# Patient Record
Sex: FEMALE | Race: WHITE | Employment: UNEMPLOYED | URBAN - METROPOLITAN AREA
[De-identification: names, ages, dates, MRNs, and addresses within clinical notes are randomized per-mention and may not be internally consistent; named-entity substitution may affect disease eponyms.]

---

## 2022-12-12 RX ORDER — ASPIRIN 81 MG/1
81 TABLET ORAL DAILY
COMMUNITY

## 2022-12-12 NOTE — PROGRESS NOTES
Name_______________________________________Printed:____________________  Date and time of surgery____12/14/2022_______0730_____________Arrival Time:_______0600_________   1. The instructions given regarding when and if a patient needs to stop oral intake prior to surgery varies. Follow the specific instructions you were given                  __x_Nothing to eat or to drink after Midnight the night before.                   ____Carbo loading or ERAS instructions will be given to select patients-if you have been given those instructions -please do the following                           The evening before your surgery after dinner before midnight drink 40 ounces of gatorade. If you are diabetic use sugar free. The morning of surgery drink 40 ounces of water. This needs to be finished 3 hours prior to your surgery start time. 2. Take the following pills with a small sip of water on the morning of surgery___________________________________________________                  Do not take blood pressure medications ending in pril or sartan the kin prior to surgery or the morning of surgery. Dr Sandee Salazar patient are not to take any medications the AM of surgery. 3. Aspirin, Ibuprofen, Advil, Naproxen, Vitamin E and other Anti-inflammatory products and supplements should be stopped for 5 -7days before surgery or as directed by your physician. 4. Check with your Doctor regarding stopping Plavix, Coumadin,Eliquis, Lovenox,Effient,Pradaxa,Xarelto, Fragmin or other blood thinners and follow their instructions. 5. Do not smoke, and do not drink any alcoholic beverages 24 hours prior to surgery. This includes NA Beer. Refrain from the usage of any recreational drugs. 6. You may brush your teeth and gargle the morning of surgery. DO NOT SWALLOW WATER   7. You MUST make arrangements for a responsible adult to stay on site while you are here and take you home after your surgery.  You will not be allowed to leave alone or drive yourself home. It is strongly suggested someone stay with you the first 24 hrs. Your surgery will be cancelled if you do not have a ride home. 8. A parent/legal guardian must accompany a child scheduled for surgery and plan to stay at the hospital until the child is discharged. Please do not bring other children with you. 9. Please wear simple, loose fitting clothing to the hospital.  Dianelys Maloney not bring valuables (money, credit cards, checkbooks, etc.) Do not wear any makeup (including no eye makeup) or nail polish on your fingers or toes. 10. DO NOT wear any jewelry or piercings on day of surgery. All body piercing jewelry must be removed. 11. If you have ___dentures, they will be removed before going to the OR; we will provide you a container. If you wear ___contact lenses or ___glasses, they will be removed; please bring a case for them. 12. Please see your family doctor/pediatrician for a history & physical and/or concerning medications. Bring any test results/reports from your physician's office. PCP__________________Phone___________H&P Appt. Date________             13 If you  have a Living Will and Durable Power of  for Healthcare, please bring in a copy. 15. Notify your Surgeon if you develop any illness between now and surgery  time, cough, cold, fever, sore throat, nausea, vomiting, etc.  Please notify your surgeon if you experience dizziness, shortness of breath or blurred vision between now & the time of your surgery             15. DO NOT shave your operative site 96 hours prior to surgery. For face & neck surgery, men may use an electric razor 48 hours prior to surgery. 16. Shower the night before or morning of surgery using an antibacterial soap or as you have been instructed. 17. To provide excellent care visitors will be limited to one in the room at any given time.              18.  Please bring picture ID and insurance card. 19.  Visit our web site for additional information:  [email or web address]              20.During flu season no children under the age of 15 are permitted in the hospital for the safety of all patients. 21. If you take a long acting insulin in the evening only  take half of your usual  dose the night  before your procedure              22. If you use a c-pap please bring DOS if staying overnight,             23.For your convenience [facility] has a pharmacy on site to fill your prescriptions. 24. If you use oxygen and have a portable tank please bring it  with you the DOS             25. Bring a complete list of all your medications with name and dose include any supplements. 26. Other__________________________________________   *Please call pre admission testing if you any further questions   Ciara [facility]   [address] [place] [address]. [place]  [phone]   [address] [place] [address]       VISITOR POLICY(subject to change)    Current policy is 2 visitors per patient. No children. Mask is  at the discretion of the facility. Visiting hours are 8a-8p. Overnight visitors will be at the discretion of the nurse. All policies subject to change. All above information reviewed with patient in person or by phone. Patient verbalizes understanding. All questions and concerns addressed.                                                                                                  Patient/Rep__patient__________________                                                                                                                                    PRE OP INSTRUCTIONS

## 2022-12-14 ENCOUNTER — ANESTHESIA EVENT (OUTPATIENT)
Dept: OPERATING ROOM | Age: 47
End: 2022-12-14
Payer: COMMERCIAL

## 2022-12-14 ENCOUNTER — HOSPITAL ENCOUNTER (OUTPATIENT)
Age: 47
Setting detail: OUTPATIENT SURGERY
Discharge: HOME | End: 2022-12-14
Attending: PODIATRIST | Admitting: PODIATRIST
Payer: COMMERCIAL

## 2022-12-14 ENCOUNTER — ANESTHESIA (OUTPATIENT)
Dept: OPERATING ROOM | Age: 47
End: 2022-12-14
Payer: COMMERCIAL

## 2022-12-14 VITALS
HEART RATE: 72 BPM | TEMPERATURE: 97.2 F | HEIGHT: 65 IN | BODY MASS INDEX: 29.82 KG/M2 | OXYGEN SATURATION: 99 % | DIASTOLIC BLOOD PRESSURE: 85 MMHG | RESPIRATION RATE: 16 BRPM | SYSTOLIC BLOOD PRESSURE: 129 MMHG | WEIGHT: 179 LBS

## 2022-12-14 PROCEDURE — 2580000003 HC RX 258: Performed by: NURSE ANESTHETIST, CERTIFIED REGISTERED

## 2022-12-14 PROCEDURE — 7100000011 HC PHASE II RECOVERY - ADDTL 15 MIN: Performed by: PODIATRIST

## 2022-12-14 PROCEDURE — 64447 NJX AA&/STRD FEMORAL NRV IMG: CPT | Performed by: ANESTHESIOLOGY

## 2022-12-14 PROCEDURE — 3600000014 HC SURGERY LEVEL 4 ADDTL 15MIN: Performed by: PODIATRIST

## 2022-12-14 PROCEDURE — 6360000002 HC RX W HCPCS: Performed by: ANESTHESIOLOGY

## 2022-12-14 PROCEDURE — 2709999900 HC NON-CHARGEABLE SUPPLY: Performed by: PODIATRIST

## 2022-12-14 PROCEDURE — 2500000003 HC RX 250 WO HCPCS: Performed by: NURSE ANESTHETIST, CERTIFIED REGISTERED

## 2022-12-14 PROCEDURE — 3600000004 HC SURGERY LEVEL 4 BASE: Performed by: PODIATRIST

## 2022-12-14 PROCEDURE — 7100000010 HC PHASE II RECOVERY - FIRST 15 MIN: Performed by: PODIATRIST

## 2022-12-14 PROCEDURE — 3700000001 HC ADD 15 MINUTES (ANESTHESIA): Performed by: PODIATRIST

## 2022-12-14 PROCEDURE — 6360000002 HC RX W HCPCS: Performed by: NURSE ANESTHETIST, CERTIFIED REGISTERED

## 2022-12-14 PROCEDURE — 2720000010 HC SURG SUPPLY STERILE: Performed by: PODIATRIST

## 2022-12-14 PROCEDURE — 6360000002 HC RX W HCPCS: Performed by: PODIATRIST

## 2022-12-14 PROCEDURE — 2580000003 HC RX 258: Performed by: PODIATRIST

## 2022-12-14 PROCEDURE — 7100000000 HC PACU RECOVERY - FIRST 15 MIN: Performed by: PODIATRIST

## 2022-12-14 PROCEDURE — 3700000000 HC ANESTHESIA ATTENDED CARE: Performed by: PODIATRIST

## 2022-12-14 PROCEDURE — 7100000001 HC PACU RECOVERY - ADDTL 15 MIN: Performed by: PODIATRIST

## 2022-12-14 PROCEDURE — C1713 ANCHOR/SCREW BN/BN,TIS/BN: HCPCS | Performed by: PODIATRIST

## 2022-12-14 PROCEDURE — C1769 GUIDE WIRE: HCPCS | Performed by: PODIATRIST

## 2022-12-14 DEVICE — CP LAG SCREW (T10)
Type: IMPLANTABLE DEVICE | Site: FOOT | Status: FUNCTIONAL
Brand: ANCHORAGE

## 2022-12-14 DEVICE — LOCKING SCREW
Type: IMPLANTABLE DEVICE | Site: FOOT | Status: FUNCTIONAL
Brand: VARIAX

## 2022-12-14 DEVICE — POLYAXIAL LOCKING PLATE -  LAPIDUS CROSS-PLATE, LEFT (T10)
Type: IMPLANTABLE DEVICE | Site: FOOT | Status: FUNCTIONAL
Brand: ANCHORAGE

## 2022-12-14 DEVICE — INJECTABLE KIT
Type: IMPLANTABLE DEVICE | Site: FOOT | Status: FUNCTIONAL
Brand: AUGMENT® INJECTABLE

## 2022-12-14 RX ORDER — SUCCINYLCHOLINE/SOD CL,ISO/PF 200MG/10ML
SYRINGE (ML) INTRAVENOUS PRN
Status: DISCONTINUED | OUTPATIENT
Start: 2022-12-14 | End: 2022-12-14 | Stop reason: SDUPTHER

## 2022-12-14 RX ORDER — ROPIVACAINE HYDROCHLORIDE 5 MG/ML
INJECTION, SOLUTION EPIDURAL; INFILTRATION; PERINEURAL PRN
Status: DISCONTINUED | OUTPATIENT
Start: 2022-12-14 | End: 2022-12-14 | Stop reason: SDUPTHER

## 2022-12-14 RX ORDER — SODIUM CHLORIDE 9 MG/ML
INJECTION, SOLUTION INTRAVENOUS PRN
Status: DISCONTINUED | OUTPATIENT
Start: 2022-12-14 | End: 2022-12-14 | Stop reason: HOSPADM

## 2022-12-14 RX ORDER — SODIUM CHLORIDE 0.9 % (FLUSH) 0.9 %
5-40 SYRINGE (ML) INJECTION EVERY 12 HOURS SCHEDULED
Status: DISCONTINUED | OUTPATIENT
Start: 2022-12-14 | End: 2022-12-14 | Stop reason: HOSPADM

## 2022-12-14 RX ORDER — SODIUM CHLORIDE, SODIUM LACTATE, POTASSIUM CHLORIDE, CALCIUM CHLORIDE 600; 310; 30; 20 MG/100ML; MG/100ML; MG/100ML; MG/100ML
INJECTION, SOLUTION INTRAVENOUS CONTINUOUS
Status: DISCONTINUED | OUTPATIENT
Start: 2022-12-14 | End: 2022-12-14 | Stop reason: HOSPADM

## 2022-12-14 RX ORDER — ONDANSETRON 2 MG/ML
4 INJECTION INTRAMUSCULAR; INTRAVENOUS
Status: DISCONTINUED | OUTPATIENT
Start: 2022-12-14 | End: 2022-12-14 | Stop reason: HOSPADM

## 2022-12-14 RX ORDER — SODIUM CHLORIDE 0.9 % (FLUSH) 0.9 %
5-40 SYRINGE (ML) INJECTION PRN
Status: DISCONTINUED | OUTPATIENT
Start: 2022-12-14 | End: 2022-12-14 | Stop reason: HOSPADM

## 2022-12-14 RX ORDER — ELETRIPTAN HYDROBROMIDE 40 MG/1
40 TABLET, FILM COATED ORAL
COMMUNITY

## 2022-12-14 RX ORDER — DEXAMETHASONE SODIUM PHOSPHATE 4 MG/ML
INJECTION, SOLUTION INTRA-ARTICULAR; INTRALESIONAL; INTRAMUSCULAR; INTRAVENOUS; SOFT TISSUE PRN
Status: DISCONTINUED | OUTPATIENT
Start: 2022-12-14 | End: 2022-12-14 | Stop reason: SDUPTHER

## 2022-12-14 RX ORDER — SODIUM CHLORIDE, SODIUM LACTATE, POTASSIUM CHLORIDE, CALCIUM CHLORIDE 600; 310; 30; 20 MG/100ML; MG/100ML; MG/100ML; MG/100ML
INJECTION, SOLUTION INTRAVENOUS CONTINUOUS PRN
Status: DISCONTINUED | OUTPATIENT
Start: 2022-12-14 | End: 2022-12-14 | Stop reason: SDUPTHER

## 2022-12-14 RX ORDER — PROPOFOL 10 MG/ML
INJECTION, EMULSION INTRAVENOUS PRN
Status: DISCONTINUED | OUTPATIENT
Start: 2022-12-14 | End: 2022-12-14 | Stop reason: SDUPTHER

## 2022-12-14 RX ORDER — FENTANYL CITRATE 50 UG/ML
INJECTION, SOLUTION INTRAMUSCULAR; INTRAVENOUS PRN
Status: DISCONTINUED | OUTPATIENT
Start: 2022-12-14 | End: 2022-12-14 | Stop reason: SDUPTHER

## 2022-12-14 RX ORDER — BUPIVACAINE HYDROCHLORIDE AND EPINEPHRINE 5; 5 MG/ML; UG/ML
INJECTION, SOLUTION PERINEURAL
Status: COMPLETED | OUTPATIENT
Start: 2022-12-14 | End: 2022-12-14

## 2022-12-14 RX ORDER — MIDAZOLAM HYDROCHLORIDE 2 MG/2ML
2 INJECTION, SOLUTION INTRAMUSCULAR; INTRAVENOUS ONCE
Status: COMPLETED | OUTPATIENT
Start: 2022-12-14 | End: 2022-12-14

## 2022-12-14 RX ORDER — ROCURONIUM BROMIDE 10 MG/ML
INJECTION, SOLUTION INTRAVENOUS PRN
Status: DISCONTINUED | OUTPATIENT
Start: 2022-12-14 | End: 2022-12-14 | Stop reason: SDUPTHER

## 2022-12-14 RX ORDER — LIDOCAINE HYDROCHLORIDE 20 MG/ML
INJECTION, SOLUTION INFILTRATION; PERINEURAL PRN
Status: DISCONTINUED | OUTPATIENT
Start: 2022-12-14 | End: 2022-12-14 | Stop reason: SDUPTHER

## 2022-12-14 RX ORDER — IBUPROFEN 800 MG/1
800 TABLET ORAL EVERY 6 HOURS PRN
COMMUNITY

## 2022-12-14 RX ORDER — ONDANSETRON 2 MG/ML
INJECTION INTRAMUSCULAR; INTRAVENOUS PRN
Status: DISCONTINUED | OUTPATIENT
Start: 2022-12-14 | End: 2022-12-14 | Stop reason: SDUPTHER

## 2022-12-14 RX ORDER — HYDROMORPHONE HCL 110MG/55ML
0.25 PATIENT CONTROLLED ANALGESIA SYRINGE INTRAVENOUS EVERY 5 MIN PRN
Status: DISCONTINUED | OUTPATIENT
Start: 2022-12-14 | End: 2022-12-14 | Stop reason: HOSPADM

## 2022-12-14 RX ORDER — LIDOCAINE HYDROCHLORIDE 10 MG/ML
0.5 INJECTION, SOLUTION EPIDURAL; INFILTRATION; INTRACAUDAL; PERINEURAL ONCE
Status: DISCONTINUED | OUTPATIENT
Start: 2022-12-14 | End: 2022-12-14 | Stop reason: HOSPADM

## 2022-12-14 RX ORDER — HYDROMORPHONE HCL 110MG/55ML
0.5 PATIENT CONTROLLED ANALGESIA SYRINGE INTRAVENOUS EVERY 5 MIN PRN
Status: DISCONTINUED | OUTPATIENT
Start: 2022-12-14 | End: 2022-12-14 | Stop reason: HOSPADM

## 2022-12-14 RX ADMIN — SODIUM CHLORIDE, POTASSIUM CHLORIDE, SODIUM LACTATE AND CALCIUM CHLORIDE: 600; 310; 30; 20 INJECTION, SOLUTION INTRAVENOUS at 08:50

## 2022-12-14 RX ADMIN — DEXAMETHASONE SODIUM PHOSPHATE 8 MG: 4 INJECTION, SOLUTION INTRAMUSCULAR; INTRAVENOUS at 07:56

## 2022-12-14 RX ADMIN — FENTANYL CITRATE 50 MCG: 50 INJECTION, SOLUTION INTRAMUSCULAR; INTRAVENOUS at 07:33

## 2022-12-14 RX ADMIN — HYDROMORPHONE HYDROCHLORIDE 0.25 MG: 2 INJECTION, SOLUTION INTRAMUSCULAR; INTRAVENOUS; SUBCUTANEOUS at 09:54

## 2022-12-14 RX ADMIN — CEFAZOLIN 2000 MG: 2 INJECTION, POWDER, FOR SOLUTION INTRAMUSCULAR; INTRAVENOUS at 07:31

## 2022-12-14 RX ADMIN — Medication 140 MG: at 07:34

## 2022-12-14 RX ADMIN — SODIUM CHLORIDE, POTASSIUM CHLORIDE, SODIUM LACTATE AND CALCIUM CHLORIDE: 600; 310; 30; 20 INJECTION, SOLUTION INTRAVENOUS at 06:43

## 2022-12-14 RX ADMIN — SODIUM CHLORIDE, POTASSIUM CHLORIDE, SODIUM LACTATE AND CALCIUM CHLORIDE: 600; 310; 30; 20 INJECTION, SOLUTION INTRAVENOUS at 07:30

## 2022-12-14 RX ADMIN — MIDAZOLAM 2 MG: 1 INJECTION INTRAMUSCULAR; INTRAVENOUS at 07:20

## 2022-12-14 RX ADMIN — PROPOFOL 200 MG: 10 INJECTION, EMULSION INTRAVENOUS at 07:33

## 2022-12-14 RX ADMIN — ROPIVACAINE HYDROCHLORIDE 30 ML: 5 INJECTION, SOLUTION EPIDURAL; INFILTRATION; PERINEURAL at 07:22

## 2022-12-14 RX ADMIN — ONDANSETRON 4 MG: 2 INJECTION INTRAMUSCULAR; INTRAVENOUS at 07:56

## 2022-12-14 RX ADMIN — SUGAMMADEX 200 MG: 100 INJECTION, SOLUTION INTRAVENOUS at 09:21

## 2022-12-14 RX ADMIN — LIDOCAINE HYDROCHLORIDE 60 MG: 20 INJECTION, SOLUTION INFILTRATION; PERINEURAL at 07:33

## 2022-12-14 RX ADMIN — ROCURONIUM BROMIDE 30 MG: 10 INJECTION, SOLUTION INTRAVENOUS at 07:54

## 2022-12-14 RX ADMIN — MIDAZOLAM 2 MG: 1 INJECTION INTRAMUSCULAR; INTRAVENOUS at 07:15

## 2022-12-14 RX ADMIN — ROPIVACAINE HYDROCHLORIDE 20 ML: 5 INJECTION, SOLUTION EPIDURAL; INFILTRATION; PERINEURAL at 07:24

## 2022-12-14 ASSESSMENT — PAIN - FUNCTIONAL ASSESSMENT: PAIN_FUNCTIONAL_ASSESSMENT: 0-10

## 2022-12-14 ASSESSMENT — PAIN DESCRIPTION - DESCRIPTORS: DESCRIPTORS: ACHING

## 2022-12-14 ASSESSMENT — PAIN DESCRIPTION - LOCATION: LOCATION: HEAD

## 2022-12-14 ASSESSMENT — PAIN SCALES - GENERAL: PAINLEVEL_OUTOF10: 6

## 2022-12-14 NOTE — PROGRESS NOTES
Received from OR - Unresponsive to stimuli ,simple mask @ 6 liters 100%,left foot dressing dry and intact,elevated,circulation good,ice pack placed,,hitesh/SCD RLE,vss.

## 2022-12-14 NOTE — ANESTHESIA PRE PROCEDURE
Department of Anesthesiology  Preprocedure Note       Name:  Anupama Moore   Age:  56 y.o.  :  1975                                          MRN:  [id number]         Date:  2022      Surgeon: Marcus Costello):  Corey Crum DPM    Procedure: Procedure(s):  LAPIDUS FUSION-LEFT FOOT; APPLICATION BELOW KNEE SPLINT-LEFT LOWER LIMB-POPLITEAL BLOCK-SAPHENOUS-JULIAN    Medications prior to admission:   Prior to Admission medications    Medication Sig Start Date End Date Taking? Authorizing Provider   Cetirizine HCl (ZYRTEC ALLERGY PO) Take by mouth daily   Yes Historical Provider, MD   ALBUTEROL SULFATE IN Inhale 2 puffs into the lungs daily 90 mcg dose   Yes Historical Provider, MD   eletriptan (RELPAX) 40 MG tablet Take 40 mg by mouth once as needed (for migraines) may repeat in 2 hours if necessary   Yes Historical Provider, MD   ibuprofen (ADVIL;MOTRIN) 800 MG tablet Take 800 mg by mouth every 6 hours as needed for Pain   Yes Historical Provider, MD   aspirin 81 MG EC tablet Take 81 mg by mouth daily   Yes Historical Provider, MD   Ferrous Sulfate (IRON PO) Take by mouth   Yes Historical Provider, MD       Current medications:    Current Facility-Administered Medications   Medication Dose Route Frequency Provider Last Rate Last Admin    lactated ringers infusion   IntraVENous Continuous Corey Crum DPM 50 mL/hr at 22 0643 New Bag at 22 0643    ceFAZolin (ANCEF) 2,000 mg in dextrose 5 % 50 mL IVPB (mini-bag)  2,000 mg IntraVENous On Call to [facility]        lidocaine PF 1 % injection 0.5 mL  0.5 mL IntraDERmal Once Corey Crum DPM           Allergies:     Allergies   Allergen Reactions    Advair Hfa [Fluticasone-Salmeterol] Itching    Bactrim [Sulfamethoxazole-Trimethoprim] Hives    Benadryl [Diphenhydramine] Hives    Dramamine [Dimenhydrinate] Hives    Levaquin [Levofloxacin] Hives    Shellfish-Derived Products Hives       Problem List:  There is no problem list on file for this patient. Past Medical History:        Diagnosis Date    Asthma     Blood clotting disorder (HCC)     CAD (coronary artery disease)     Heart palpitations     Low iron     Migraine     Prolonged emergence from general anesthesia        Past Surgical History:        Procedure Laterality Date    ABDOMEN SURGERY      APPENDECTOMY      BREAST SURGERY      HYSTERECTOMY (CERVIX STATUS UNKNOWN)      MYOMECTOMY      OVARY REMOVAL      WISDOM TOOTH EXTRACTION         Social History:    Social History     Tobacco Use    Smoking status: Never     Passive exposure: Never    Smokeless tobacco: Never   Substance Use Topics    Alcohol use: Never                                Counseling given: Not Answered      Vital Signs (Current):   Vitals:    12/12/22 1547 12/14/22 0608   BP:  115/83   Pulse:  74   Resp:  15   Temp:  98.6 °F (37 °C)   TempSrc:  Temporal   SpO2:  100%   Weight: 165 lb (74.8 kg) 179 lb (81.2 kg)   Height: 5' 5\" (1.651 m) 5' 5\" (1.651 m)                                              BP Readings from Last 3 Encounters:   12/14/22 115/83       NPO Status: Time of last liquid consumption: 0000                        Time of last solid consumption: 0000                        Date of last liquid consumption: 12/14/22                        Date of last solid food consumption: 12/14/22    BMI:   Wt Readings from Last 3 Encounters:   12/14/22 179 lb (81.2 kg)     Body mass index is 29.79 kg/m². CBC: No results found for: WBC, RBC, HGB, HCT, MCV, RDW, PLT    CMP: No results found for: NA, K, CL, CO2, BUN, CREATININE, GFRAA, AGRATIO, LABGLOM, GLUCOSE, GLU, PROT, CALCIUM, BILITOT, ALKPHOS, AST, ALT    POC Tests: No results for input(s): POCGLU, POCNA, POCK, POCCL, POCBUN, POCHEMO, POCHCT in the last 72 hours.     Coags: No results found for: PROTIME, INR, APTT    HCG (If Applicable): No results found for: PREGTESTUR, PREGSERUM, HCG, HCGQUANT     ABGs: No results found for: PHART, PO2ART, XTY2UIC, VHY0WEM, BEART, U8HEATUM     Type & Screen (If Applicable):  No results found for: LABABO, LABRH    Drug/Infectious Status (If Applicable):  No results found for: HIV, HEPCAB    COVID-19 Screening (If Applicable): No results found for: COVID19        Anesthesia Evaluation  Patient summary reviewed and Nursing notes reviewed history of anesthetic complications (delayed emergence ):   Airway: Mallampati: I  TM distance: >3 FB   Neck ROM: full  Mouth opening: > = 3 FB   Dental: normal exam         Pulmonary:normal exam  breath sounds clear to auscultation  (+) asthma:                            Cardiovascular:  Exercise tolerance: good (>4 METS),       (-) CAD      Rhythm: regular  Rate: normal                    Neuro/Psych:   (+) headaches:,             GI/Hepatic/Renal: Neg GI/Hepatic/Renal ROS            Endo/Other: Negative Endo/Other ROS                    Abdominal:             Vascular: Other Findings:           Anesthesia Plan      MAC, general and regional     ASA 2     (Pop block left )  Induction: intravenous. MIPS: Postoperative opioids intended and Prophylactic antiemetics administered. Anesthetic plan and risks discussed with patient. Plan discussed with CRNA.     Attending anesthesiologist reviewed and agrees with Preprocedure content                Cecile Leon MD   12/14/2022

## 2022-12-14 NOTE — PROGRESS NOTES
Discharge instructions reviewed with patient/responsible adult. All home medications have been reviewed, questions answered and patient verbalized understanding. Discharge instructions signed and copies given. Patient discharged  per w/cwmayda belongings.

## 2022-12-14 NOTE — PROGRESS NOTES
Time out done, 02 on @ 2 l/min per n/c, then versed IV given , then Dr Cb Costello completed left popliteal & adductor canal nerve block, patient tolerated procedure well.

## 2022-12-14 NOTE — OP NOTE
Operative Note      Patient: Nelly Lomeli  YOB: 1975  MRN: [id number]    Date of Procedure: 12/14/2022    Pre-Op Diagnosis: Hallux valgus of left foot [M20.12]    Post-Op Diagnosis: Same       Procedure(s):  03689 Lapidus Fusion- left foot;  70943 Application Below Knee Splint- left lower limb     Surgeon(s):  Michelle Gallegos DPM     Assistant:  Jad Eden DPM, PGY-3  Gonzalo Louise, MS-4     Anesthesia: General with preoperative popliteal/saphenous nerve block     Hemostasis: Pneumatic calf tourniquet at 250 mmHg for 66 minutes     Injectables: 3 cc of 0.5% marcaine with epinephrine preoperatively     Materials: 3-0 vicryl, 4-0 vicryl, 5-0 vicryl     Implants:   Zhane [place] 2                Lapidus left CP plate x1                3.5 x 14mm locking screw x2                3.5 x 16mm locking screw x1                3.5 x 18mm locking screw x1                4.1 x 36mm lag screw x1  Zhane DartFire Edge Cannulated 4.0x36mm screw x1  Anton Augment Injectable 1.5cc     Estimated Blood Loss (mL): Minimal    Complications: None    Specimens:   * No specimens in log *    Implants:  Implant Name Type Inv.  Item Serial No.  Lot No. LRB No. Used Action   GRAFT BONE TIB INJ 1.5CC ALLOGRFT AUGMENT - [id number]  GRAFT BONE TIB INJ 1.5CC ALLOGRFT AUGMENT  [facility] [id number] Left 1 Implanted   PLATE POLYAXIAL LK LAPIDUS CROSS LT T10 - [id number]  PLATE POLYAXIAL LK LAPIDUS CROSS LT T10  [employer] [facility]  Left 1 Implanted   SCREW LK 16MM T10 FULL - [id number]  SCREW LK 16MM T10 FULL  [facility] [facility]  Left 1 Implanted   SCREW LK T10 FLTHRD 3.5X18MM - [id number]  SCREW LK T10 FLTHRD 3.5X18MM  [employer] [facility]  Left 1 Implanted   SCREW LK 14MM T10 FULL - [id number]  SCREW LK 14MM T10 FULL  [employer] [facility]  Left 2 Implanted   SCREW CP LAG 4.1X36MM T10 - [id number]  SCREW CP LAG 4.1X36MM T10  ZHANE SHIRA-WD  Left 1 Implanted DARTFIRE EDGE CANNULATED SCREW    [employer] DIV_CR [id number] Left 1 Implanted         Drains: * No LDAs found *    Findings: Good correction of above noted deformity upon completion of procedure. INDICATIONS FOR PROCEDURE: This patient has signs and symptoms clinically  consistent with the above mentioned preoperative diagnosis. Having failed conservative treatment, it was determined that the patient would benefit from surgical intervention. All potential risks, benefits, and complications were discussed with the patient prior to the scheduling of surgery. All the patient's questions were answered and no guarantees were given. The patient wished to proceed with surgery, and informed written consent was obtained. DETAILS OF PROCEDURE: The patient received a popliteal/saphenous nerve block performed by the anesthesiologist in the preoperative holding area. The patient was then brought from the pre-operative area and placed on the operating table in the supine position. A pneumatic calf tourniquet was placed around the patient's well-padded left lower extremity. Following IV sedation, 3cc of 0.5% marcaine with epinephrine was injected along the preoperatively marked incision line to aid in hemostasis. The left  lower extremity was then scrubbed, prepped, and draped in the usual sterile fashion. A time-out was performed. The patient, procedure, and operative site were confirmed. An Esmarch bandage was then utilized to exsanguinate the patient's left lower extremity. The tourniquet was then inflated to 250 mmHg and the following procedure was performed. PROCEDURE #1: 58357- Lapidus Fusion- left foot:  Attention was then directed towards the dorsal medial aspect of the 1st metatarsal cuneiform joint of the left foot.  Using a #15 blade, an approximately 8 cm skin incision was made over the dorsal medial aspect of the 1st metatarsal cuneiform joint and extended to just distal to the 1st metatarsal phalangeal joint. The incision was deepened through the subcutaneous tissues to the level of the deep fascia and capsule using a combination of sharp and blunt dissection. All bleeders were ligated using an electrocautery system. Attention was then directed to the first interspace via the original skin incision where the tendon of the extensor hallucis longus was observed and retracted and protected. Through a combination of sharp and blunt dissection the soft tissue contractures of the deep transverse intermetatarsal ligament, adductor hallucis tendon, and fibular suspensory ligament were released. Next, the hallux was distracted and pulled medially to further release the residual lateral contracture. At this time, great improvement of the lateral deviation of the hallux on the metatarsal head was noted. Next, using a #15 blade, the capsule overlying the first metatarsal cuneiform joint was incised and reflected from the underlying bone. Great care was taken to identify and protect the Tibialis Anterior tendon in the process. Using a small osteotome, the 1st metatarsal cuneiform joint was pried open to allow full access for preparation of the joint. After mobilizing the joint and freeing it from it's soft tissue attachments, attention was directed towards joint preparation. Using a sagittal saw with a #138 blade, the cartilage on the base of the 1st metatarsal was resected and passed from the operating field. This bone cut was made parallel to the existing cartilage on the metatarsal base. Next, the sagittal saw was used to resect the cartilage on the distal aspect of the medial cuneiform. This resection was angled to resect slightly more laterally to aid in intermetatarsal angle reduction. After satisfactory planning with the sagittal saw had been obtained, the surgical site was irrigated with copious amounts of normal saline.  Next, the joint surfaces and subchondral plates of the medial cuneiform and base of the first metatarsal were fenestrated using a 2.0mm drill bit. This was done until good bleeding bone was noted. Next, [employer] Augment Injectable was added to the arthrodesis site according to 's recommendations to optimize postoperative bony healing. Next, while grasping the hallux, the 1st metatarsal was derotated out of its varus attitude and the intermetatarsal angle was reduced and the joint surfaces firmly apposed. A 0.062 k-wire was driven from the base of the first metatarsal to the medial cuneiform as a means of temporary fixation. The intermetatarsal angle, fusion site, and tibial sesamoid position were checked using live intraoperative fluoroscopy and noted to be excellent. Length of the 1st metatarsal within the metatarsal parabola was noted to be maintained as well and the 1st metatarsal was noted to be parallel to the 2nd metatarsal on a lateral projection with no elevatus or plantarflexion noted. At this time, a template from the [employer]/[place] 2 system was placed over the fusion site and a K-wire was driven through the area of the pocket screw hole. Position of the plate was assess via c-arm and noted to be adequate. Next, using the 's recommendations, and the K-wire as a guide, the pocket for the compression screw was created using a cannulated reamer. The K-wire was removed and the [employer]/[place] 2 Lapidus plate was placed overtop the fusion site and held in place by two olive wires. Position of the plate was assessed and noted to be excellent. Next, using the 's recommendations and modified AO technique, the plate was secured using 3.5mm locking screws and a 4.1 x 36mm lag screw in the pocket compression hole. Once the plate was in place, all temporary fixation was removed. A K-wire guidewire was then placed from the dorsal aspect of the medial cuneiform to the plantar aspect of the 1st metatarsal base.  Position of the guidewire was assessed via C-arm and noted to be correct. Next, using the 's recommendations and modified AO technique, a Zhane DartFire Edge 4.0 x 36mm cannulated screw was inserted from the dorsal medial cuneiform to the plantar 1st metatarsal. The temporary fixation was removed. At this time, the hardware, position of the foot, and fusion site were assessed via C-arm and noted to be excellent. The surgical wound was then irrigated using copious amounts of normal saline and then attention was then directed towards closure. The deep fascia and capsular layer overlying the joint was re-approximated using 3-0 vicryl in a continuous running suture technique. The subcutaneous tissues were then approximated using 4-0 vicryl. And the skin edges were re-approximated using 5-0 vicryl in a running intradermal suture technique. PROCEDURE #2: 40384- Application Below Knee Splint- left lower limb: At this time, a soft sterile dressing was applied consisting of dermabond, adaptic, gauze, melissa, cast padding, ACE bandage. The pneumatic calf tourniquet was rapidly deflated after a total time of 66 minutes and a prompt hyperemic response was noted on all aspects of the patient's left lower extremity. Next, copious amounts of cast padding was applied to the patient's left lower extremity from the metatarsal heads to approximately 3 finger breaths distal to the head and neck of the fibula. Next, using moistened padded splint material, a posterior splint was applied from the plantar foot posteriorly up the leg to approximately the midcalf area. ACE compression was then applied from distal to proximal to secure the posterior splint in place and provide compression to prevent post-operative edema. At this time, the foot was then placed in a neutral position to prevent acquired equinus deformity and relieve tension on the surgical repair.      END OF PROCEDURE: The patient tolerated the procedure and anesthesia well and was transported from the operating room to the PACU with vital signs stable and vascular status intact to all aspects of the patient's left lower extremity and digital capillary refill time immediate to the digits of the left  foot. Following a period of post-operative monitoring, the patient will be discharged home with written and oral wound care and follow-up instructions per Dr. Salbador Lopez. The patient is to follow-up with Dr. Monisha Luque in his private office within 5-7 days. The patient is to keep dressing clean, dry and intact at all times. The patient is to call with if any complications occur.     Dictated on behalf of Dr. Salbador Lopez, Alline Primrose, DPM  Podiatric Resident, PGY-3     Electronically signed by Raysa Aguilar DPM on 12/14/2022 at 9:32 AM

## 2022-12-14 NOTE — BRIEF OP NOTE
Brief Postoperative Note      Patient: Leida Weber  YOB: 1975  MRN: [id number]    Date of Procedure: 12/14/2022    Pre-Op Diagnosis: Hallux valgus of left foot [M20.12]    Post-Op Diagnosis: Same       Procedure(s):  63689 Lapidus Fusion- left foot;  75165 Application Below Knee Splint- left lower limb    Surgeon(s):  Tan Dickson DPM    Assistant:  Colby Torres DPM, PGY-3  Brina Blount MS-4    Anesthesia: General with preoperative popliteal/saphenous nerve block    Hemostasis: Pneumatic calf tourniquet at 250 mmHg for 66 minutes    Injectables: 3 cc of 0.5% marcaine with epinephrine preoperatively    Materials: 3-0 vicryl, 4-0 vicryl, 5-0 vicryl    Implants:   Zhane Vigo 2   Lapidus left CP plate x1   3.5 x 98QA locking screw x2   3.5 x 16mm locking screw x1   3.5 x 18mm locking screw x1   4.1 x 36mm lag screw x1  Zhane DartFire Edge Cannulated 4.0x36mm screw x1  Muldoon Augment Injectable 1.5cc    Estimated Blood Loss (mL): Minimal    Complications: None    Specimens:   * No specimens in log *    Implants:  Implant Name Type Inv.  Item Serial No.  Lot No. LRB No. Used Action   GRAFT BONE TIB INJ 1.5CC ALLOGRFT AUGMENT - [id number]  GRAFT BONE TIB INJ 1.5CC ALLOGRFT AUGMENT  [facility] [id number] Left 1 Implanted   PLATE POLYAXIAL LK LAPIDUS CROSS LT T10 - [id number]  PLATE POLYAXIAL LK LAPIDUS CROSS LT T10  [employer] [facility]  Left 1 Implanted   SCREW LK 16MM T10 FULL - [id number]  SCREW LK 16MM T10 FULL  [employer] [facility]  Left 1 Implanted   SCREW LK T10 FLTHRD 3.5X18MM - [id number]  SCREW LK T10 FLTHRD 3.5X18MM  [employer] [facility]  Left 1 Implanted   SCREW LK 14MM T10 FULL - [id number]  SCREW LK 14MM T10 FULL  [employer] [facility]  Left 2 Implanted   SCREW CP LAG 4.1X36MM T10 - [id number]  SCREW CP LAG 4.1X36MM T10  [employer]  Left 1 Implanted   DARTFIRE EDGE CANNULATED SCREW    [employer] [id number] Left 1 Implanted         Drains: * No LDAs found *    Findings: see op report    Electronically signed by Emi Garcias DPM on 12/14/2022 at 9:26 AM

## 2022-12-14 NOTE — PROGRESS NOTES
Teaching/ education completed for home care including pain management, activity,safety precautions and infection control. Patient verbalized understanding.

## 2022-12-14 NOTE — ANESTHESIA PROCEDURE NOTES
Peripheral Block    Patient location during procedure: pre-op  Reason for block: procedure for pain, post-op pain management and at surgeon's request  Start time: 12/14/2022 7:15 AM  End time: 12/14/2022 7:23 AM  Staffing  Anesthesiologist: Sebas Armas MD  Preanesthetic Checklist  Completed: patient identified, IV checked, site marked, risks and benefits discussed, surgical/procedural consents, equipment checked, pre-op evaluation, timeout performed, anesthesia consent given, oxygen available, monitors applied/VS acknowledged, fire risk safety assessment completed and verbalized and blood product R/B/A discussed and consented  Peripheral Block   Patient position: supine  Patient monitoring: cardiac monitor, continuous pulse ox, IV access, oxygen and responsive to questions  Block type: Saphenous  Laterality: left  Injection technique: single-shot  Guidance: ultrasound guided  Local infiltration: lidocaine  Infiltration strength: 1 %  Local infiltration: lidocaine  Dose: 1 mL    Needle   Needle type: pencil-tip   Needle gauge: 21 G  Needle localization: ultrasound guidance  Needle length: 8 cm  Assessment   Injection assessment: negative aspiration for heme, no paresthesia on injection, local visualized surrounding nerve on ultrasound and no intravascular symptoms  Paresthesia pain: none  Slow fractionated injection: yes  Hemodynamics: stable  Real-time US image taken/store: yes  Block outcome: pt extremely anxious despite 4 mg versed ,kept pulling leg and food during procedure.

## 2022-12-14 NOTE — ANESTHESIA POSTPROCEDURE EVALUATION
Department of Anesthesiology  Postprocedure Note    Patient: Shanell Araujo  MRN: [id number]  YOB: 1975  Date of evaluation: 12/14/2022      Procedure Summary     Date: 12/14/22 Room / Location: [address]    Anesthesia Start: 0730 Anesthesia Stop: 0930    Procedure: LAPIDUS FUSION-LEFT FOOT; APPLICATION BELOW KNEE SPLINT-LEFT LOWER LIMB-POPLITEAL BLOCK-SAPHENOUS-JULIAN (Left: Foot) Diagnosis:       Hallux valgus of left foot      (Hallux valgus of left foot [M20.12])    Surgeons: Parviz Latham DPM Responsible Provider: Romario Tai MD    Anesthesia Type: MAC, general, regional ASA Status: 2          Anesthesia Type: No value filed.     Chuck Phase I: Chuck Score: 5    Chuck Phase II:        Anesthesia Post Evaluation    Patient location during evaluation: PACU  Patient participation: complete - patient participated  Level of consciousness: awake  Airway patency: patent  Nausea & Vomiting: no nausea and no vomiting  Complications: no  Cardiovascular status: blood pressure returned to baseline  Respiratory status: acceptable  Hydration status: stable

## 2022-12-14 NOTE — DISCHARGE INSTRUCTIONS
[address]   Telephone: [phone]  Dr. Dania Meredith [address] , [address] [facility]  (1[address]4 [address] of [facility], by the corner of [address])                                           Post Operative Instructions    1. Have Prescriptions filled and take as directed. All medications should be taken with food or milk. 2.  Keep foot elevated six inches above the level of the heart. Support feet, legs, and knees with pillows. Your heel should be resting past the edge of the pillow. Keep pressure off the back of your heel. 3.  KEEP FOOT ELEVATED AS MUCH AS POSSIBLE UNTIL YOUR NEXT VISIT    4. Place an ice pack on the bandaged site for 15 minutes every hour while awake    5. Keep dressing clean, dry, and intact. DO NOT REMOVE DRESSING. CALL THE OFFICE IF BANDAGE COMES OFF. 6.  For the first 7 days after surgery, take temperature by mouth three times a day. Call the office if greater than 101F.    7.  Ambulate with nonweightbearing to the left foot with crutches / walker. 8.  All instructions are to be followed until otherwise instructed by your surgeon. 9.  Call our office if you have any concerns or questions which arise. Our phones are answered 24 hours a day. [phone]    19. Your first post-operative appointment is scheduled, call the office for appointment date and time if not known prior to today. ORTHOPEDIC/PODIATRY DISCHARGE INSTRUCTIONS    Follow your surgeons instructions. Make follow-up appointment. Observe operative area for signs of excessive bleeding such as a slow general ooze that saturates the dressing or bright red bleeding. In either case, apply pressure to the area and elevate if possible and call your surgeon right away. Observe the affected extremity for circulation or nerve impairment such as a change in color, numbness, tingling, coldness or increased pain.  If any of these symptoms are present call your surgeon. Observe operative site for any signs of infection such as increased pain, redness, fever greater than 101 degrees, swelling, foul odor or drainage. Contact surgeon if any of these symptoms are present. If you become short of breath call your surgeon or go to the nearest emergency room. Elevate extremity as directed by surgeon. Use ice pack as directed by surgeon. Do not use heat. Avoid stress to suture line such as pulling, pushing or tugging. Use crutches as directed by surgeon  Take medications as ordered. Take pain medication with food. Do not drive or operate machinery while taking narcotics. Call your surgeon for any questions or problems. ANESTHESIA DISCHARGE INSTRUCTIONS    Wear your seatbelt home. You are under the influence of drugs-do not drink alcohol, drive, operate machinery, make any important decisions or sign any legal documents for 24 hours. A responsible adult needs to be with you for 24 hours. You may experience lightheadedness, dizziness, or sleepiness following surgery. Rest at home today- increase activity as tolerated. Progress slowly to a regular diet unless your physician has instructed you otherwise. Drink plenty of water. If persistent nausea and vomiting becomes a problem, call your physician. Coughing, sore throat and muscle aches are other side effects of anesthesia, and should improve with time. Do not drive or operate machinery while taking narcotics.

## 2022-12-14 NOTE — PROGRESS NOTES
Tearful tensing arms up ,c/o headache ,light above turned off,sips of pepsi given reassurance given ,dilaudid 0.25 mg IVP given .

## 2022-12-14 NOTE — PROGRESS NOTES
PHASE 2 -Awake and calm ,room air, sipping soda ,denies surgical pain,ableto lift leg off mattress,circulation good,vss.

## (undated) DEVICE — MERCY FAIRFIELD TURNOVER KIT: Brand: MEDLINE INDUSTRIES, INC.

## (undated) DEVICE — STOCKINETTE,IMPERVIOUS,12X48,STERILE: Brand: MEDLINE

## (undated) DEVICE — HYPODERMIC SAFETY NEEDLE: Brand: MAGELLAN

## (undated) DEVICE — INTENDED FOR TISSUE SEPARATION, AND OTHER PROCEDURES THAT REQUIRE A SHARP SURGICAL BLADE TO PUNCTURE OR CUT.: Brand: BARD-PARKER ® STAINLESS STEEL BLADES

## (undated) DEVICE — 3M™ IOBAN™ 2 ANTIMICROBIAL INCISE DRAPE 6650EZ: Brand: IOBAN™ 2

## (undated) DEVICE — PRECISION THIN (9.0 X 0.38 X 25.0MM)

## (undated) DEVICE — SUTURE VCRL + SZ 3-0 L27IN ABSRB UD CT-2 L26MM 1/2 CIR TAPR VCP232H

## (undated) DEVICE — BANDAGE,GAUZE,BULKEE II,4.5"X4.1YD,STRL: Brand: MEDLINE

## (undated) DEVICE — 3M™ STERI-STRIP™ REINFORCED ADHESIVE SKIN CLOSURES, R1546, 1/4 IN X 4 IN (6 MM X 100 MM), 10 STRIPS/ENVELOPE: Brand: 3M™ STERI-STRIP™

## (undated) DEVICE — WIRE FIXATION .045IN 5IN C-WIRE SS SPADE
Type: IMPLANTABLE DEVICE | Site: FOOT | Status: NON-FUNCTIONAL
Removed: 2022-12-14

## (undated) DEVICE — HOLDING PIN: Brand: ANCHORAGE

## (undated) DEVICE — SUTURE VCRL + SZ 4-0 L27IN ABSRB UD L26MM SH 1/2 CIR VCP415H

## (undated) DEVICE — Device

## (undated) DEVICE — ADHESIVE SKIN CLSR 0.7ML TOP DERMBND ADV

## (undated) DEVICE — 3M™ COBAN™ NL STERILE NON-LATEX SELF-ADHERENT WRAP, 2084S, 4 IN X 5 YD (10 CM X 4,5 M), 18 ROLLS/CASE: Brand: 3M™ COBAN™

## (undated) DEVICE — BANDAGE COMPR W6INXL10YD ST M E WHITE/BEIGE

## (undated) DEVICE — MASTISOL ADHESIVE LIQ 2/3ML

## (undated) DEVICE — PADDING CAST W6INXL4YD ST COT RAYON MICROPLEATED HIGHLY

## (undated) DEVICE — CANNULATED DRILL: Brand: FIXOS

## (undated) DEVICE — TOWEL,OR,DSP,ST,BLUE,DLX,10/PK,8PK/CS: Brand: MEDLINE

## (undated) DEVICE — MEDICINE CUP, GRADUATED, STER: Brand: MEDLINE

## (undated) DEVICE — T-DRAPE,EXTREMITY,STERILE: Brand: MEDLINE

## (undated) DEVICE — DEVON TUBE HOLDER REMOVABLE TOUCH FASTEN STRAP: Brand: DEVON

## (undated) DEVICE — REAMER FOR CROSS-PLATES: Brand: ANCHORAGE

## (undated) DEVICE — SYRINGE IRRIG 60ML SFT PLIABLE BLB EZ TO GRP 1 HND USE W/

## (undated) DEVICE — BIT DRL K WIRE 1.6X200 MM 2 MM FOR SCR VARIAX

## (undated) DEVICE — UNTHREADED GUIDE WIRE: Brand: FIXOS

## (undated) DEVICE — ELECTRODE PT RET AD L9FT HI MOIST COND ADH HYDRGEL CORDED

## (undated) DEVICE — GAUZE,SPONGE,4"X4",8PLY,STRL,LF,10/TRAY: Brand: MEDLINE

## (undated) DEVICE — SPEEDGUIDE DRILL AO: Brand: VARIAX

## (undated) DEVICE — BANDAGE COMPR W6INXL12FT SMOOTH FOR LIMB EXSANG ESMARCH

## (undated) DEVICE — SUTURE COAT VCRL SZ 5-0 L18IN ABSRB UD L19MM PS-2 1/2 CIR J495G

## (undated) DEVICE — STRIP SKIN CLOSURE 4X0.25 IN REINF N WOVEN WHT STERI STRP

## (undated) DEVICE — SYRINGE, LUER LOCK, 10ML: Brand: MEDLINE

## (undated) DEVICE — C-ARM: Brand: UNBRANDED